# Patient Record
Sex: MALE | Race: WHITE
[De-identification: names, ages, dates, MRNs, and addresses within clinical notes are randomized per-mention and may not be internally consistent; named-entity substitution may affect disease eponyms.]

---

## 2017-10-05 ENCOUNTER — HOSPITAL ENCOUNTER (EMERGENCY)
Dept: HOSPITAL 31 - C.ER | Age: 52
LOS: 1 days | Discharge: HOME | End: 2017-10-06
Payer: COMMERCIAL

## 2017-10-05 DIAGNOSIS — E78.00: ICD-10-CM

## 2017-10-05 DIAGNOSIS — I10: ICD-10-CM

## 2017-10-05 DIAGNOSIS — N20.0: Primary | ICD-10-CM

## 2017-10-05 PROCEDURE — 74176 CT ABD & PELVIS W/O CONTRAST: CPT

## 2017-10-05 PROCEDURE — 96361 HYDRATE IV INFUSION ADD-ON: CPT

## 2017-10-05 PROCEDURE — 80053 COMPREHEN METABOLIC PANEL: CPT

## 2017-10-05 PROCEDURE — 83690 ASSAY OF LIPASE: CPT

## 2017-10-05 PROCEDURE — 96374 THER/PROPH/DIAG INJ IV PUSH: CPT

## 2017-10-05 PROCEDURE — 81001 URINALYSIS AUTO W/SCOPE: CPT

## 2017-10-05 PROCEDURE — 96375 TX/PRO/DX INJ NEW DRUG ADDON: CPT

## 2017-10-05 PROCEDURE — 85025 COMPLETE CBC W/AUTO DIFF WBC: CPT

## 2017-10-05 PROCEDURE — 99284 EMERGENCY DEPT VISIT MOD MDM: CPT

## 2017-10-06 VITALS
DIASTOLIC BLOOD PRESSURE: 83 MMHG | OXYGEN SATURATION: 97 % | RESPIRATION RATE: 18 BRPM | HEART RATE: 67 BPM | SYSTOLIC BLOOD PRESSURE: 134 MMHG

## 2017-10-06 VITALS — TEMPERATURE: 99 F

## 2017-10-06 LAB
ALBUMIN/GLOB SERPL: 1 {RATIO} (ref 1–2.1)
ALP SERPL-CCNC: 68 U/L (ref 38–126)
ALT SERPL-CCNC: 53 U/L (ref 21–72)
AST SERPL-CCNC: 31 U/L (ref 17–59)
BASOPHILS # BLD AUTO: 0.1 K/UL (ref 0–0.2)
BASOPHILS NFR BLD: 0.8 % (ref 0–2)
BILIRUB SERPL-MCNC: 0.5 MG/DL (ref 0.2–1.3)
BILIRUB UR-MCNC: NEGATIVE MG/DL
BUN SERPL-MCNC: 16 MG/DL (ref 9–20)
CALCIUM SERPL-MCNC: 8.9 MG/DL (ref 8.6–10.4)
CHLORIDE SERPL-SCNC: 97 MMOL/L (ref 98–107)
CO2 SERPL-SCNC: 23 MMOL/L (ref 22–30)
EOSINOPHIL # BLD AUTO: 0.2 K/UL (ref 0–0.7)
EOSINOPHIL NFR BLD: 1.9 % (ref 0–4)
ERYTHROCYTE [DISTWIDTH] IN BLOOD BY AUTOMATED COUNT: 12.9 % (ref 11.5–14.5)
GLOBULIN SER-MCNC: 4.1 GM/DL (ref 2.2–3.9)
GLUCOSE SERPL-MCNC: 231 MG/DL (ref 75–110)
GLUCOSE UR STRIP-MCNC: (no result) MG/DL
HCT VFR BLD CALC: 44.5 % (ref 35–51)
KETONES UR STRIP-MCNC: NEGATIVE MG/DL
LEUKOCYTE ESTERASE UR-ACNC: (no result) LEU/UL
LYMPHOCYTES # BLD AUTO: 2.1 K/UL (ref 1–4.3)
LYMPHOCYTES NFR BLD AUTO: 19.2 % (ref 20–40)
MCH RBC QN AUTO: 30.3 PG (ref 27–31)
MCHC RBC AUTO-ENTMCNC: 33.9 G/DL (ref 33–37)
MCV RBC AUTO: 89.4 FL (ref 80–94)
MONOCYTES # BLD: 0.8 K/UL (ref 0–0.8)
MONOCYTES NFR BLD: 7.6 % (ref 0–10)
NRBC BLD AUTO-RTO: 0 % (ref 0–2)
PH UR STRIP: 5 [PH] (ref 5–8)
PLATELET # BLD: 276 K/UL (ref 130–400)
PMV BLD AUTO: 8 FL (ref 7.2–11.7)
POTASSIUM SERPL-SCNC: 4.3 MMOL/L (ref 3.6–5.2)
PROT SERPL-MCNC: 8 G/DL (ref 6.3–8.3)
PROT UR STRIP-MCNC: NEGATIVE MG/DL
RBC # UR STRIP: (no result) /UL
RBC #/AREA URNS HPF: 17 /HPF (ref 0–3)
SODIUM SERPL-SCNC: 133 MMOL/L (ref 132–148)
SP GR UR STRIP: 1.02 (ref 1–1.03)
UROBILINOGEN UR-MCNC: NORMAL MG/DL (ref 0.2–1)
WBC # BLD AUTO: 10.8 K/UL (ref 4.8–10.8)
WBC #/AREA URNS HPF: 3 /HPF (ref 0–5)

## 2017-10-06 NOTE — CT
EXAM:

  CT Abdomen and Pelvis Without Intravenous Contrast



EXAM DATE/TIME:

  10/6/2017 1:10 AM



CLINICAL HISTORY:

  52 years old, male; Pain; Abdominal pain; Patient HX: 11-13-15; Additional 

info: Left flank pain, HX of kidney stones



TECHNIQUE:

  Axial computed tomography images of the abdomen and pelvis without 

intravenous contrast.  All CT scans at this facility use one or more dose 

reduction techniques, viz.: automated exposure control; ma/kV adjustment per 

patient size (including targeted exams where dose is matched to indication; 

i.e. head); or iterative reconstruction technique.

  Coronal and sagittal reformatted images were created and reviewed.



COMPARISON:

  CT - ABD   PELVIS IV CONTRAST ONLY 11/13/2015 6:12:44 PM



FINDINGS:



  The gallbladder is contracted.



  The liver is decreased in attenuation consistent with fatty infiltration.



  The  adrenals, spleen, and pancreas appear grossly normal on this 

non-contrast study. 



   The previously seen obstructing calculi right UVJ is no longer present. The 

right hydronephrosis has resolved.



  There is new mild left hydronephrosis, hydroureter, and left perinephric 

stranding. There is a 3 x 2 mm calculi in the very distal left ureter.



  The bowel appears grossly normal.  



  A normal appendix is identified axial images 146-152.



  Small lymph nodes are noted in the groins.





IMPRESSION:   



   Obstructing calculi very distal left ureter.

## 2017-10-06 NOTE — C.PDOC
History Of Present Illness


Patient presents to the ER with a complaint of left flank pain radiating to the 

back and groin that began a few hours ago, associated with nausea. Patient 

reports he has a Hx of kidney stones; denies fever, chills, or vomiting.


Time Seen by Provider: 10/06/17 00:15


Chief Complaint (Nursing): Abdominal Pain


History Per: Patient


History/Exam Limitations: no limitations


Onset/Duration Of Symptoms: Hrs


Current Symptoms Are (Timing): Still Present


Severity: Mild


Pain Scale Rating Of: 4


Location Of Pain/Discomfort: Other (Left flank)


Radiation Of Pain To:: None


Quality Of Discomfort: Unable To Describe


Associated Symptoms: Nausea.  denies: Fever, Chills, Vomiting


Exacerbating Factors: None


Alleviating Factors: None


Recent travel outside of the [place]: No





Past Medical History


Reviewed: Historical Data, Nursing Documentation, Vital Signs


Vital Signs: 


 Last Vital Signs











Temp  99 F   10/06/17 00:02


 


Pulse  77   10/06/17 00:02


 


Resp  17   10/06/17 00:02


 


BP  165/95 H  10/06/17 00:02


 


Pulse Ox  100   10/06/17 01:01














- Medical History


PMH: Diverticulitis, HTN, Hypercholesterolemia


Surgical History: Tonsillectomy


Family History: States: No Known Family Hx





- Social History


Hx Alcohol Use: No


Hx Substance Use: No





- Immunization History


Hx Tetanus Toxoid Vaccination: No


Hx Influenza Vaccination: No


Hx Pneumococcal Vaccination: No





Review Of Systems


Constitutional: Negative for: Fever, Chills


Gastrointestinal: Positive for: Nausea.  Negative for: Vomiting


Genitourinary: Positive for: Other (Groin pain radiating from left flank)


Musculoskeletal: Positive for: Back Pain (Left flank)





Physical Exam





- Physical Exam


Appears: Non-toxic


Skin: Warm, Dry


Head: Normacephalic


Oral Mucosa: Moist


Chest: Symmetrical


Cardiovascular: Rhythm Regular


Respiratory: No Rales, No Rhonchi, No Wheezing


Gastrointestinal/Abdominal: Soft, No Tenderness, Distention


Back: CVA Tenderness (Mild, left), Other (Left flank)


Neurological/Psych: Oriented x3





ED Course And Treatment





- Laboratory Results


Result Diagrams: 


 10/06/17 00:32





 10/06/17 00:32


O2 Sat by Pulse Oximetry: 100 (Room air)


Pulse Ox Interpretation: Normal


Progress Note: Blood work and urinalysis ordered. Flomax, toradol, zofran, and 

IV fluids administered.


Reevaluation Time: 02:38


Reassessment Condition: Improved





Disposition


Counseled Patient/Family Regarding: Studies Performed, Diagnosis, Need For 

Followup, Rx Given





- Disposition


Referrals: 


Mj crane MD [Primary Care Provider] - 


Chema Matute MD [Staff Provider] - 


Disposition: HOME/ ROUTINE


Disposition Time: 00:15


Condition: FAIR


Prescriptions: 


Naproxen [Naprosyn] 1 tab PO BID PRN #15 tab


 PRN Reason: Pain


Ondansetron ODT [Zofran ODT] 1 odt PO BID PRN #6 odt


 PRN Reason: Nausea/Vomiting


oxyCODONE/Acetaminophen [Percocet 5/325 mg Tab] 1 tab PO QID PRN #14 tab


 PRN Reason: Pain


Tamsulosin [Flomax] 0.4 mg PO DAILY #15 cap


Instructions:  Renal Colic (ED), Kidney Stones (DC), How to Strain Your Urine (

ED), Flank Pain (ED)


Forms:  [facility] (English)





- Clinical Impression


Clinical Impression: 


 Renal colic on left side, Kidney stone on left side








- Scribe Statement


The provider has reviewed the documentation as recorded by the Scribe





Christiano Rivera





All medical record entries made by the Scribe were at my direction and 

personally dictated by me. I have reviewed the chart and agree that the record 

accurately reflects my personal performance of the history, physical exam, 

medical decision making, and the department course for this patient. I have 

also personally directed, reviewed, and agree with the discharge instructions 

and disposition.

## 2018-02-12 ENCOUNTER — HOSPITAL ENCOUNTER (INPATIENT)
Dept: HOSPITAL 31 - C.ER | Age: 53
LOS: 3 days | Discharge: HOME | DRG: 152 | End: 2018-02-15
Payer: COMMERCIAL

## 2018-02-12 VITALS — BODY MASS INDEX: 42.2 KG/M2

## 2018-02-12 DIAGNOSIS — E78.00: ICD-10-CM

## 2018-02-12 DIAGNOSIS — Z87.442: ICD-10-CM

## 2018-02-12 DIAGNOSIS — Z88.2: ICD-10-CM

## 2018-02-12 DIAGNOSIS — I10: ICD-10-CM

## 2018-02-12 DIAGNOSIS — E11.65: ICD-10-CM

## 2018-02-12 DIAGNOSIS — K61.2: Primary | ICD-10-CM

## 2018-02-12 DIAGNOSIS — L03.317: ICD-10-CM

## 2018-02-12 LAB
ALBUMIN SERPL-MCNC: 3.8 G/DL (ref 3.5–5)
ALBUMIN/GLOB SERPL: 0.9 {RATIO} (ref 1–2.1)
ALT SERPL-CCNC: 37 U/L (ref 21–72)
AST SERPL-CCNC: 27 U/L (ref 17–59)
BACTERIA #/AREA URNS HPF: (no result) /[HPF]
BASOPHILS # BLD AUTO: 0.1 K/UL (ref 0–0.2)
BASOPHILS NFR BLD: 0.5 % (ref 0–2)
BILIRUB UR-MCNC: NEGATIVE MG/DL
BUN SERPL-MCNC: 10 MG/DL (ref 9–20)
CALCIUM SERPL-MCNC: 9 MG/DL (ref 8.6–10.4)
EOSINOPHIL # BLD AUTO: 0.2 K/UL (ref 0–0.7)
EOSINOPHIL NFR BLD: 1.5 % (ref 0–4)
ERYTHROCYTE [DISTWIDTH] IN BLOOD BY AUTOMATED COUNT: 12.5 % (ref 11.5–14.5)
GFR NON-AFRICAN AMERICAN: > 60
GLUCOSE UR STRIP-MCNC: (no result) MG/DL
HGB BLD-MCNC: 13.9 G/DL (ref 12–18)
LEUKOCYTE ESTERASE UR-ACNC: (no result) LEU/UL
LIPASE: 41 U/L (ref 23–300)
LYMPHOCYTES # BLD AUTO: 1.9 K/UL (ref 1–4.3)
LYMPHOCYTES NFR BLD AUTO: 16.6 % (ref 20–40)
MCH RBC QN AUTO: 29.9 PG (ref 27–31)
MCHC RBC AUTO-ENTMCNC: 33.7 G/DL (ref 33–37)
MCV RBC AUTO: 88.8 FL (ref 80–94)
MONOCYTES # BLD: 1 K/UL (ref 0–0.8)
MONOCYTES NFR BLD: 8.9 % (ref 0–10)
NEUTROPHILS # BLD: 8.4 K/UL (ref 1.8–7)
NEUTROPHILS NFR BLD AUTO: 72.5 % (ref 50–75)
NRBC BLD AUTO-RTO: 0 % (ref 0–2)
PH UR STRIP: 5 [PH] (ref 5–8)
PLATELET # BLD: 333 K/UL (ref 130–400)
PMV BLD AUTO: 7.9 FL (ref 7.2–11.7)
PROT UR STRIP-MCNC: NEGATIVE MG/DL
RBC # BLD AUTO: 4.66 MIL/UL (ref 4.4–5.9)
RBC # UR STRIP: NEGATIVE /UL
SP GR UR STRIP: 1.03 (ref 1–1.03)
URINE NITRATE: NEGATIVE
UROBILINOGEN UR-MCNC: NORMAL MG/DL (ref 0.2–1)
WBC # BLD AUTO: 11.5 K/UL (ref 4.8–10.8)

## 2018-02-12 NOTE — C.PDOC
History Of Present Illness





<Alicia Ramon - Last Filed: 02/12/18 23:26>





<Neha Coffey - Last Filed: 02/12/18 23:50>


52 year old male with a Hx of diabetes presents to the ER with a complaint of 

pain to the right inner buttock area for more than a week. Patient was seen by 

PMD who gave him percocet and started him on cipro, whoever, patient reports 

increased pain and difficulty urinating due to pain. Patient reports having 

subjective fever yesterday, denies injury, rectal bleeding, or diarrhea. (Alicia Ramon)


History Per: Patient


History/Exam Limitations: no limitations


Onset/Duration Of Symptoms: Days


Current Symptoms Are (Timing): Still Present


Recent travel outside of the [place]: No





<Alicia Ramon - Last Filed: 02/12/18 23:26>





<Neha Coffey - Last Filed: 02/12/18 23:50>


Time Seen by Provider: 02/12/18 19:14


Chief Complaint (Nursing): Abnormal Skin Integrity





Past Medical History


Reviewed: Historical Data, Nursing Documentation, Vital Signs





- Medical History


PMH: Diverticulitis, HTN, Hypercholesterolemia


Surgical History: Tonsillectomy


Family History: States: Unknown Family Hx





- Social History


Hx Alcohol Use: No


Hx Substance Use: No





- Immunization History


Hx Tetanus Toxoid Vaccination: No


Hx Influenza Vaccination: No


Hx Pneumococcal Vaccination: No





<Alicia Ramon - Last Filed: 02/12/18 23:26>


Vital Signs: 





 Last Vital Signs











Temp  101.1 F H  02/12/18 21:40


 


Pulse  91 H  02/12/18 21:40


 


Resp  18   02/12/18 21:40


 


BP  114/64   02/12/18 21:40


 


Pulse Ox  98   02/12/18 23:30














Review Of Systems


Constitutional: Positive for: Fever (subjective)


Gastrointestinal: Positive for: Other (inner right buttock pain, no rectal 

bleeding).  Negative for: Nausea, Vomiting, Diarrhea


Genitourinary: Positive for: Other (Difficulty urinating)





<Alicia Ramon - Last Filed: 02/12/18 23:26>





Physical Exam





- Physical Exam


Appears: Non-toxic


Skin: Normal Color, Warm, Dry


Head: Atraumatic, Normacephalic


Eye(s): bilateral: Normal Inspection


Oral Mucosa: Moist


Chest: Symmetrical, No Tenderness


Cardiovascular: Rhythm Regular


Respiratory: Normal Breath Sounds, No Rales, No Rhonchi, No Wheezing


Gastrointestinal/Abdominal: Soft, No Tenderness


Neurological/Psych: Oriented x3, Normal Speech





<Alicia Ramon - Last Filed: 02/12/18 23:26>





ED Course And Treatment





- Laboratory Results


Result Diagrams: 


 02/12/18 20:10





 02/12/18 20:10


O2 Sat by Pulse Oximetry: 98 (Room air)


Pulse Ox Interpretation: Normal


Progress Note: CT pelvis, blood work, and urinalysis ordered. IV fluids 

administered. case was signed out to  at 11 pm. CT pelvis- pending





<Alicia Ramon - Last Filed: 02/12/18 23:26>





- Laboratory Results


Result Diagrams: 


 02/12/18 20:10





 02/12/18 20:10





<Neha Coffey - Last Filed: 02/12/18 23:50>





Disposition





- Disposition


Disposition Time: 23:27





<Alicia Ramon - Last Filed: 02/12/18 23:26>


Discussed With DrRoss: Fatimah Sanz


Comment: He accepted pt on his service and gave admitting orders to the nurse.


Doctor Will See Patient In The: Hospital





- POA


Present On Arrival: Poor Glycemic Control





<Neha Coffey - Last Filed: 02/12/18 23:50>





- Disposition


Disposition: HOSPITALIZED


Condition: FAIR


Forms:  [facility] (English)





- Clinical Impression


Clinical Impression: 


 Failure of outpatient treatment, Fever, Cellulitis of perianal region, 

Uncontrolled diabetes mellitus








- PA / NP / Resident Statement


ALISHA has reviewed & agrees with the documentation as recorded.





- Scribe Statement


The provider has reviewed the documentation as recorded by the Scribe





<Alicia Ramon - Last Filed: 02/12/18 23:26>





- PA / NP / Resident Statement


ALISHA has reviewed & agrees with the documentation as recorded.





<Neha Coffey - Last Filed: 02/12/18 23:50>





- Scribe Statement





Christiano Rivera





All medical record entries made by the Scribe were at my direction and 

personally dictated by me. I have reviewed the chart and agree that the record 

accurately reflects my personal performance of the history, physical exam, 

medical decision making, and the department course for this patient. I have 

also personally directed, reviewed, and agree with the discharge instructions 

and disposition. (Alicia Ramon)





Physician Patient Turnover


Patient Signed Over To: Neha Coffey


Handoff Comments: CT pelvis and dispo pending





<Alicia Ramon - Last Filed: 02/12/18 23:26>

## 2018-02-12 NOTE — CT
EXAM:

  CT Pelvis With Intravenous Contrast



CLINICAL HISTORY:

  52 years old, male; Signs and symptoms; Cellulitis and other: Abscess; 

Buttock; Additional info: Perirectal abscess



TECHNIQUE:

  Axial computed tomography images of the pelvis with intravenous contrast.  

All CT scans at this facility use one or more dose reduction techniques, viz.: 

automated exposure control; ma/kV adjustment per patient size (including 

targeted exams where dose is matched to indication; i.e. head); or iterative 

reconstruction technique.

  Coronal and sagittal reformatted images were created and reviewed.



CONTRAST:

  100 mL of omnipaque 300 administered intravenously.



COMPARISON:

  CT - ABD   PELVIS IV CONTRAST ONLY 2015-11-13 18:12



FINDINGS:

  Bowel:  No obstruction.  No mucosal thickening.

  Appendix:  No findings to suggest acute appendicitis.

  Intraperitoneal space:  No significant fluid collection.  No free air.

  Bladder:  Unremarkable.

  Reproductive:  Mildly enlarged prostate.

  Bones/joints:  No acute fracture.

  Soft tissues:  4.8 x 2.0 x 4.7 cm ill-defined lesion measuring slightly 

higher than fluid attenuation within right perianal region.  Mild stranding 

within right ischiorectal fossa.  

  Vasculature:  Unremarkable.  No aneurysm.

  Lymph nodes:  No pathologically enlarged lymph nodes.



IMPRESSION:     

1.  Probable perianal phlegmon/early abscess.  Clinical correlation is needed.

2.  Incidental/non-acute findings are described above.

## 2018-02-13 LAB
ALBUMIN SERPL-MCNC: 3.6 G/DL (ref 3.5–5)
ALBUMIN/GLOB SERPL: 0.9 {RATIO} (ref 1–2.1)
ALT SERPL-CCNC: 37 U/L (ref 21–72)
AST SERPL-CCNC: 19 U/L (ref 17–59)
BASOPHILS # BLD AUTO: 0.1 K/UL (ref 0–0.2)
BASOPHILS NFR BLD: 0.5 % (ref 0–2)
BUN SERPL-MCNC: 9 MG/DL (ref 9–20)
CALCIUM SERPL-MCNC: 7.8 MG/DL (ref 8.6–10.4)
EOSINOPHIL # BLD AUTO: 0.2 K/UL (ref 0–0.7)
EOSINOPHIL NFR BLD: 2 % (ref 0–4)
ERYTHROCYTE [DISTWIDTH] IN BLOOD BY AUTOMATED COUNT: 12.5 % (ref 11.5–14.5)
GFR NON-AFRICAN AMERICAN: > 60
HDLC SERPL-MCNC: 21 MG/DL (ref 30–70)
HGB BLD-MCNC: 13.6 G/DL (ref 12–18)
LDLC SERPL-MCNC: 140 MG/DL (ref 0–129)
LYMPHOCYTES # BLD AUTO: 1.8 K/UL (ref 1–4.3)
LYMPHOCYTES NFR BLD AUTO: 17.3 % (ref 20–40)
MCH RBC QN AUTO: 30.5 PG (ref 27–31)
MCHC RBC AUTO-ENTMCNC: 34.4 G/DL (ref 33–37)
MCV RBC AUTO: 88.7 FL (ref 80–94)
MONOCYTES # BLD: 1 K/UL (ref 0–0.8)
MONOCYTES NFR BLD: 9.9 % (ref 0–10)
NEUTROPHILS # BLD: 7.4 K/UL (ref 1.8–7)
NEUTROPHILS NFR BLD AUTO: 70.3 % (ref 50–75)
NRBC BLD AUTO-RTO: 0.1 % (ref 0–2)
PLATELET # BLD: 292 K/UL (ref 130–400)
PMV BLD AUTO: 7.6 FL (ref 7.2–11.7)
RBC # BLD AUTO: 4.48 MIL/UL (ref 4.4–5.9)
WBC # BLD AUTO: 10.5 K/UL (ref 4.8–10.8)

## 2018-02-13 RX ADMIN — OXYCODONE HYDROCHLORIDE AND ACETAMINOPHEN PRN TAB: 5; 325 TABLET ORAL at 16:31

## 2018-02-13 RX ADMIN — SODIUM CHLORIDE SCH MLS/HR: 9 INJECTION, SOLUTION INTRAMUSCULAR; INTRAVENOUS; SUBCUTANEOUS at 11:45

## 2018-02-13 RX ADMIN — SODIUM CHLORIDE SCH MLS/HR: 9 INJECTION, SOLUTION INTRAVENOUS at 16:40

## 2018-02-13 RX ADMIN — SODIUM CHLORIDE SCH MLS/HR: 9 INJECTION, SOLUTION INTRAMUSCULAR; INTRAVENOUS; SUBCUTANEOUS at 13:07

## 2018-02-13 RX ADMIN — OXYCODONE HYDROCHLORIDE AND ACETAMINOPHEN PRN TAB: 5; 325 TABLET ORAL at 22:32

## 2018-02-13 RX ADMIN — HUMAN INSULIN SCH: 100 INJECTION, SOLUTION SUBCUTANEOUS at 22:00

## 2018-02-13 RX ADMIN — HUMAN INSULIN SCH UNIT: 100 INJECTION, SOLUTION SUBCUTANEOUS at 17:12

## 2018-02-13 RX ADMIN — SODIUM CHLORIDE SCH: 9 INJECTION, SOLUTION INTRAMUSCULAR; INTRAVENOUS; SUBCUTANEOUS at 00:48

## 2018-02-13 RX ADMIN — HUMAN INSULIN SCH UNIT: 100 INJECTION, SOLUTION SUBCUTANEOUS at 13:16

## 2018-02-13 RX ADMIN — OXYCODONE HYDROCHLORIDE AND ACETAMINOPHEN PRN TAB: 5; 325 TABLET ORAL at 01:26

## 2018-02-13 RX ADMIN — SODIUM CHLORIDE SCH MLS/HR: 9 INJECTION, SOLUTION INTRAMUSCULAR; INTRAVENOUS; SUBCUTANEOUS at 05:45

## 2018-02-13 RX ADMIN — SODIUM CHLORIDE SCH MLS/HR: 9 INJECTION, SOLUTION INTRAVENOUS at 22:15

## 2018-02-13 RX ADMIN — HUMAN INSULIN SCH UNIT: 100 INJECTION, SOLUTION SUBCUTANEOUS at 09:14

## 2018-02-13 RX ADMIN — SODIUM CHLORIDE SCH MLS/HR: 9 INJECTION, SOLUTION INTRAVENOUS at 10:15

## 2018-02-13 RX ADMIN — SODIUM CHLORIDE SCH MLS/HR: 9 INJECTION, SOLUTION INTRAMUSCULAR; INTRAVENOUS; SUBCUTANEOUS at 18:15

## 2018-02-13 RX ADMIN — OXYCODONE HYDROCHLORIDE AND ACETAMINOPHEN PRN TAB: 5; 325 TABLET ORAL at 09:17

## 2018-02-13 NOTE — CP.PCM.CON
<Stu Plummer - Last Filed: 02/13/18 01:44>





History of Present Illness





- History of Present Illness


History of Present Illness: 


General Surgery Consult





HPI: 52M presents to the ED with pain to the right gluteal cleft x 5 days. Pt 

seen by PMD prior to this and was given percocet and cipro. His pain still 

increased to 10/10 and he had dysurea due to pain. Pt reports having subjective 

fever yesterday (reports 101 in ED). Denies headache, chest pain, SOB, abd pain

, diarrhea, constipation, hematuria.





PMH: DM, HTN, Hypercholesterolemia, Chronic upper back and R arm pain


PSH: Tonsillectomy


SH: No tobacco, EtOH, or drug use


All: sulfa (rash)


Meds: see MAR





Review of Systems





- Review of Systems


All systems: reviewed and no additional remarkable complaints except (as per HPI

)





Past Patient History





- Infectious Disease


Hx of Infectious Diseases: None





- Past Social History


Smoking Status: Never Smoked





- CARDIAC


Hx Hypercholesterolemia: Yes


Hx Hypertension: Yes





- ENDOCRINE/METABOLIC


Hx Endocrine Disorders: Yes


Hx Diabetes Mellitus Type 2: Yes





- GASTROINTESTINAL


Hx Diverticulitis: Yes





- GENITOURINARY/GYNECOLOGICAL


Other/Comment: kidney stone





- PSYCHIATRIC


Hx Substance Use: No





- SURGICAL HISTORY


Hx Tonsillectomy: Yes





- ANESTHESIA


Hx Anesthesia: Yes


Hx Anesthesia Reactions: No





Meds


Allergies/Adverse Reactions: 


 Allergies











Allergy/AdvReac Type Severity Reaction Status Date / Time


 


Sulfa (Sulfonamide Allergy   Verified 02/12/18 17:35





Antibiotics)     














- Medications


Medications: 


 Current Medications





Acetaminophen (Tylenol 325mg Tab)  650 mg PO Q6 PRN


   PRN Reason: Pain, Mild (1-3)


Clindamycin Phosphate 300 mg/ (Sodium Chloride)  52 mls @ 100 mls/hr IVPB Q6H 

JOY


   Last Admin: 02/13/18 00:48 Dose:  Not Given


Insulin Human Regular (Novolin R)  0 unit SC ACHS JOY


   PRN Reason: Protocol


Oxycodone/Acetaminophen (Percocet 5/325 Mg Tab)  1 tab PO Q6H PRN


   PRN Reason: Pain, moderate (4-7)


   Stop: 02/16/18 00:31


   Last Admin: 02/13/18 01:26 Dose:  1 tab











Physical Exam





- Constitutional


Appears: Non-toxic, No Acute Distress





- Head Exam


Head Exam: ATRAUMATIC, NORMOCEPHALIC





- Eye Exam


Eye Exam: EOMI.  absent: Scleral icterus





- ENT Exam


ENT Exam: Mucous Membranes Dry


Additional comments: 





trachea midline





- Respiratory Exam


Respiratory Exam: NORMAL BREATHING PATTERN.  absent: Respiratory Distress





- Cardiovascular Exam


Cardiovascular Exam: RRR, +S1, +S2





- GI/Abdominal Exam


GI & Abdominal Exam: Soft.  absent: Distended, Firm, Guarding, Rigid, Tenderness





- Rectal Exam


Additional comments: 


good sphincter tone. TTP and induration along R gluteal fold towards perineum. 


No internal opening/fistula seen or palpated. 





- Extremities Exam


Extremities exam: Positive for: normal capillary refill.  Negative for: calf 

tenderness





- Back Exam


Back exam: absent: CVA tenderness (L), CVA tenderness (R)





- Neurological Exam


Neurological exam: Alert, Oriented x3





- Psychiatric Exam


Psychiatric exam: Normal Affect, Normal Mood





- Skin


Skin Exam: Dry, Warm





Results





- Vital Signs


Recent Vital Signs: 


 Last Vital Signs











Temp  98.8 F   02/13/18 01:24


 


Pulse  103 H  02/13/18 01:24


 


Resp  20   02/13/18 01:24


 


BP  125/80   02/13/18 01:24


 


Pulse Ox  98   02/13/18 01:24














- Labs


Result Diagrams: 


 02/12/18 20:10





 02/12/18 20:10


Labs: 


 Laboratory Results - last 24 hr











  02/12/18 02/12/18 02/12/18





  20:10 20:10 20:10


 


WBC  11.5 H  


 


RBC  4.66  


 


Hgb  13.9  


 


Hct  41.4  


 


MCV  88.8  


 


MCH  29.9  


 


MCHC  33.7  


 


RDW  12.5  


 


Plt Count  333  


 


MPV  7.9  


 


Neut % (Auto)  72.5  


 


Lymph % (Auto)  16.6 L  


 


Mono % (Auto)  8.9  


 


Eos % (Auto)  1.5  


 


Baso % (Auto)  0.5  


 


Neut # (Auto)  8.4 H  


 


Lymph # (Auto)  1.9  


 


Mono # (Auto)  1.0 H  


 


Eos # (Auto)  0.2  


 


Baso # (Auto)  0.1  


 


Sodium   134 


 


Potassium   3.7 


 


Chloride   93 L 


 


Carbon Dioxide   27 


 


Anion Gap   17 


 


BUN   10 


 


Creatinine   0.8 


 


Est GFR ( Amer)   > 60 


 


Est GFR (Non-Af Amer)   > 60 


 


POC Glucose (mg/dL)   


 


Random Glucose   274 H 


 


Calcium   9.0 


 


Total Bilirubin   0.5 


 


AST   27 


 


ALT   37 


 


Alkaline Phosphatase   94 


 


Total Protein   8.0 


 


Albumin   3.8 


 


Globulin   4.1 H 


 


Albumin/Globulin Ratio   0.9 L 


 


Lipase   41 


 


Urine Color    Yellow


 


Urine Clarity    Clear


 


Urine pH    5.0


 


Ur Specific Gravity    1.029


 


Urine Protein    Negative


 


Urine Glucose (UA)    3+ H


 


Urine Ketones    Trace


 


Urine Blood    Negative


 


Urine Nitrate    Negative


 


Urine Bilirubin    Negative


 


Urine Urobilinogen    Normal


 


Ur Leukocyte Esterase    Neg


 


Urine WBC (Auto)    < 1


 


Urine RBC (Auto)    < 1


 


Ur Transition Epith Cell    < 1


 


Urine Bacteria    Rare














  02/13/18





  00:47


 


WBC 


 


RBC 


 


Hgb 


 


Hct 


 


MCV 


 


MCH 


 


MCHC 


 


RDW 


 


Plt Count 


 


MPV 


 


Neut % (Auto) 


 


Lymph % (Auto) 


 


Mono % (Auto) 


 


Eos % (Auto) 


 


Baso % (Auto) 


 


Neut # (Auto) 


 


Lymph # (Auto) 


 


Mono # (Auto) 


 


Eos # (Auto) 


 


Baso # (Auto) 


 


Sodium 


 


Potassium 


 


Chloride 


 


Carbon Dioxide 


 


Anion Gap 


 


BUN 


 


Creatinine 


 


Est GFR ( Amer) 


 


Est GFR (Non-Af Amer) 


 


POC Glucose (mg/dL)  186 H


 


Random Glucose 


 


Calcium 


 


Total Bilirubin 


 


AST 


 


ALT 


 


Alkaline Phosphatase 


 


Total Protein 


 


Albumin 


 


Globulin 


 


Albumin/Globulin Ratio 


 


Lipase 


 


Urine Color 


 


Urine Clarity 


 


Urine pH 


 


Ur Specific Gravity 


 


Urine Protein 


 


Urine Glucose (UA) 


 


Urine Ketones 


 


Urine Blood 


 


Urine Nitrate 


 


Urine Bilirubin 


 


Urine Urobilinogen 


 


Ur Leukocyte Esterase 


 


Urine WBC (Auto) 


 


Urine RBC (Auto) 


 


Ur Transition Epith Cell 


 


Urine Bacteria 














- Imaging and Cardiology


  ** CT scan - pelvis


Status: Image reviewed by me, Report reviewed by me





Assessment & Plan





- Assessment and Plan (Free Text)


Assessment: 


52M with perianal phlegmon and cellulitis


Plan: 


IV abx


Stool softener


IVF


Warm compress to R side of gluteal cleft


Will consider I&D if pt forms a drainable abscess


Will D/W Dr. Carolyn Plummer PGY4





<Yuri Leon - Last Filed: 02/15/18 20:58>





Results





- Vital Signs


Recent Vital Signs: 


 Last Vital Signs











Temp  97.3 F L  02/15/18 07:00


 


Pulse  81   02/15/18 07:00


 


Resp  18   02/15/18 07:00


 


BP  132/84   02/15/18 07:00


 


Pulse Ox  95   02/15/18 07:00














- Labs


Result Diagrams: 


 02/15/18 07:38





 02/15/18 07:38


Labs: 


 Laboratory Results - last 24 hr











  02/14/18 02/15/18 02/15/18





  21:35 06:46 07:38


 


WBC    6.1


 


RBC    4.54


 


Hgb    13.8


 


Hct    40.3


 


MCV    88.7


 


MCH    30.3


 


MCHC    34.1


 


RDW    12.4


 


Plt Count    315


 


MPV    7.7


 


Neut % (Auto)    57.9


 


Lymph % (Auto)    26.0


 


Mono % (Auto)    9.8


 


Eos % (Auto)    5.6 H


 


Baso % (Auto)    0.7


 


Neut # (Auto)    3.5


 


Lymph # (Auto)    1.6


 


Mono # (Auto)    0.6


 


Eos # (Auto)    0.3


 


Baso # (Auto)    0.0


 


Sodium   


 


Potassium   


 


Chloride   


 


Carbon Dioxide   


 


Anion Gap   


 


BUN   


 


Creatinine   


 


Est GFR ( Amer)   


 


Est GFR (Non-Af Amer)   


 


POC Glucose (mg/dL)  283 H  257 H 


 


Random Glucose   


 


Hemoglobin A1c   


 


Calcium   














  02/15/18 02/15/18 02/15/18





  07:38 07:38 11:18


 


WBC   


 


RBC   


 


Hgb   


 


Hct   


 


MCV   


 


MCH   


 


MCHC   


 


RDW   


 


Plt Count   


 


MPV   


 


Neut % (Auto)   


 


Lymph % (Auto)   


 


Mono % (Auto)   


 


Eos % (Auto)   


 


Baso % (Auto)   


 


Neut # (Auto)   


 


Lymph # (Auto)   


 


Mono # (Auto)   


 


Eos # (Auto)   


 


Baso # (Auto)   


 


Sodium  132  


 


Potassium  4.0  


 


Chloride  94 L  


 


Carbon Dioxide  27  


 


Anion Gap  15  


 


BUN  11  


 


Creatinine  0.8  


 


Est GFR ( Amer)  > 60  


 


Est GFR (Non-Af Amer)  > 60  


 


POC Glucose (mg/dL)    314 H


 


Random Glucose  305 H  


 


Hemoglobin A1c   9.8 H 


 


Calcium  8.5 L  














Attending/Attestation





- Attestation


I have personally seen and examined this patient.: Yes


I have fully participated in the care of the patient.: Yes


I have reviewed all pertinent clinical information: Yes


Notes (Text): 





Pt was seen and examined at bedside 


Agree with above note and assessment


Pt with left gluteal pain for 5 days


Perineal tenderness present


Labs and Radiology reviewed


Ass: Gluteal cellulitis with possible abscess


Plan : 


IV antibiotics


NPO, IVF 


c.w current mx


Plan d.w pt in detail


Risk and benefit explained in detail

## 2018-02-13 NOTE — CP.PCM.HP
History of Present Illness





- History of Present Illness


History of Present Illness: 





A 52 year old male with history of diabetes, hypertension, elevated cholesterol

, back and shoulder pain came for right side rectal pain for a week. He was 

unable to sit down because of pain. He had fever at home. He visited my office 

five days ago and was given a prescription for cipro antibiotics.


He had urination difficulty due to the pain.


He denies nausea, vomiting, cough, constipation or diarrhea.





Present on Admission





- Present on Admission


Any Indicators Present on Admission: No


History of DVT/PE: No


History of Uncontrolled Diabetes: No


Urinary Catheter: No


Decubitus Ulcer Present: No





Review of Systems





- Constitutional


Constitutional: Chills





- Cardiovascular


Cardiovascular: absent: Chest Pain





- Respiratory


Respiratory: absent: Cough





- Gastrointestinal


Gastrointestinal: absent: Bloating, Constipation, Cramping, Diarrhea





- Genitourinary


Genitourinary: Difficulty Urinating





- Musculoskeletal


Musculoskeletal: absent: Abnormal Gait





Past Patient History





- Infectious Disease


Hx of Infectious Diseases: None





- Past Social History


Smoking Status: Never Smoked





- CARDIAC


Hx Hypercholesterolemia: Yes


Hx Hypertension: Yes





- ENDOCRINE/METABOLIC


Hx Endocrine Disorders: Yes


Hx Diabetes Mellitus Type 2: Yes





- GASTROINTESTINAL


Hx Diverticulitis: Yes





- GENITOURINARY/GYNECOLOGICAL


Other/Comment: kidney stone





- PSYCHIATRIC


Hx Substance Use: No





- SURGICAL HISTORY


Hx Tonsillectomy: Yes





- ANESTHESIA


Hx Anesthesia: Yes


Hx Anesthesia Reactions: No





Meds


Allergies/Adverse Reactions: 


 Allergies











Allergy/AdvReac Type Severity Reaction Status Date / Time


 


Sulfa (Sulfonamide Allergy   Verified 02/12/18 17:35





Antibiotics)     














Physical Exam





- Constitutional


Appears: Non-toxic





- Neck Exam


Neck exam: Positive for: Normal Inspection





- Respiratory Exam


Respiratory Exam: Clear to Auscultation Bilateral.  absent: Wheezes





- Cardiovascular Exam


Cardiovascular Exam: REGULAR RHYTHM.  absent: Systolic Murmur





- GI/Abdominal Exam


GI & Abdominal Exam: Soft.  absent: Tenderness





- Rectal Exam


Rectal Exam: NORMAL INSPECTION (redness and tenderness on right rectal area)





Results





- Vital Signs


Recent Vital Signs: 





 Last Vital Signs











Temp  97.6 F   02/13/18 13:40


 


Pulse  78   02/13/18 13:40


 


Resp  20   02/13/18 13:40


 


BP  127/80   02/13/18 13:40


 


Pulse Ox  98   02/13/18 13:40














- Labs


Result Diagrams: 


 02/13/18 09:25





 02/13/18 09:25


Labs: 





 Laboratory Results - last 24 hr











  02/12/18 02/12/18 02/12/18





  20:10 20:10 20:10


 


WBC  11.5 H  


 


RBC  4.66  


 


Hgb  13.9  


 


Hct  41.4  


 


MCV  88.8  


 


MCH  29.9  


 


MCHC  33.7  


 


RDW  12.5  


 


Plt Count  333  


 


MPV  7.9  


 


Neut % (Auto)  72.5  


 


Lymph % (Auto)  16.6 L  


 


Mono % (Auto)  8.9  


 


Eos % (Auto)  1.5  


 


Baso % (Auto)  0.5  


 


Neut # (Auto)  8.4 H  


 


Lymph # (Auto)  1.9  


 


Mono # (Auto)  1.0 H  


 


Eos # (Auto)  0.2  


 


Baso # (Auto)  0.1  


 


Sodium   134 


 


Potassium   3.7 


 


Chloride   93 L 


 


Carbon Dioxide   27 


 


Anion Gap   17 


 


BUN   10 


 


Creatinine   0.8 


 


Est GFR ( Amer)   > 60 


 


Est GFR (Non-Af Amer)   > 60 


 


POC Glucose (mg/dL)   


 


Random Glucose   274 H 


 


Calcium   9.0 


 


Total Bilirubin   0.5 


 


AST   27 


 


ALT   37 


 


Alkaline Phosphatase   94 


 


Total Protein   8.0 


 


Albumin   3.8 


 


Globulin   4.1 H 


 


Albumin/Globulin Ratio   0.9 L 


 


Triglycerides   


 


Cholesterol   


 


LDL Cholesterol Direct   


 


HDL Cholesterol   


 


Lipase   41 


 


Urine Color    Yellow


 


Urine Clarity    Clear


 


Urine pH    5.0


 


Ur Specific Gravity    1.029


 


Urine Protein    Negative


 


Urine Glucose (UA)    3+ H


 


Urine Ketones    Trace


 


Urine Blood    Negative


 


Urine Nitrate    Negative


 


Urine Bilirubin    Negative


 


Urine Urobilinogen    Normal


 


Ur Leukocyte Esterase    Neg


 


Urine WBC (Auto)    < 1


 


Urine RBC (Auto)    < 1


 


Ur Transition Epith Cell    < 1


 


Urine Bacteria    Rare














  02/13/18 02/13/18 02/13/18





  00:47 09:09 09:25


 


WBC    10.5


 


RBC    4.48


 


Hgb    13.6


 


Hct    39.7


 


MCV    88.7


 


MCH    30.5


 


MCHC    34.4


 


RDW    12.5


 


Plt Count    292


 


MPV    7.6


 


Neut % (Auto)    70.3


 


Lymph % (Auto)    17.3 L


 


Mono % (Auto)    9.9


 


Eos % (Auto)    2.0


 


Baso % (Auto)    0.5


 


Neut # (Auto)    7.4 H


 


Lymph # (Auto)    1.8


 


Mono # (Auto)    1.0 H


 


Eos # (Auto)    0.2


 


Baso # (Auto)    0.1


 


Sodium   


 


Potassium   


 


Chloride   


 


Carbon Dioxide   


 


Anion Gap   


 


BUN   


 


Creatinine   


 


Est GFR ( Amer)   


 


Est GFR (Non-Af Amer)   


 


POC Glucose (mg/dL)  186 H  222 H 


 


Random Glucose   


 


Calcium   


 


Total Bilirubin   


 


AST   


 


ALT   


 


Alkaline Phosphatase   


 


Total Protein   


 


Albumin   


 


Globulin   


 


Albumin/Globulin Ratio   


 


Triglycerides   


 


Cholesterol   


 


LDL Cholesterol Direct   


 


HDL Cholesterol   


 


Lipase   


 


Urine Color   


 


Urine Clarity   


 


Urine pH   


 


Ur Specific Gravity   


 


Urine Protein   


 


Urine Glucose (UA)   


 


Urine Ketones   


 


Urine Blood   


 


Urine Nitrate   


 


Urine Bilirubin   


 


Urine Urobilinogen   


 


Ur Leukocyte Esterase   


 


Urine WBC (Auto)   


 


Urine RBC (Auto)   


 


Ur Transition Epith Cell   


 


Urine Bacteria   














  02/13/18 02/13/18 02/13/18





  09:25 12:59 16:26


 


WBC   


 


RBC   


 


Hgb   


 


Hct   


 


MCV   


 


MCH   


 


MCHC   


 


RDW   


 


Plt Count   


 


MPV   


 


Neut % (Auto)   


 


Lymph % (Auto)   


 


Mono % (Auto)   


 


Eos % (Auto)   


 


Baso % (Auto)   


 


Neut # (Auto)   


 


Lymph # (Auto)   


 


Mono # (Auto)   


 


Eos # (Auto)   


 


Baso # (Auto)   


 


Sodium  132  


 


Potassium  3.7  


 


Chloride  94 L  


 


Carbon Dioxide  28  


 


Anion Gap  15  


 


BUN  9  


 


Creatinine  0.8  


 


Est GFR ( Amer)  > 60  


 


Est GFR (Non-Af Amer)  > 60  


 


POC Glucose (mg/dL)   314 H  309 H


 


Random Glucose  254 H  


 


Calcium  7.8 L  


 


Total Bilirubin  0.7  


 


AST  19  


 


ALT  37  


 


Alkaline Phosphatase  81  


 


Total Protein  7.5  


 


Albumin  3.6  


 


Globulin  3.9  


 


Albumin/Globulin Ratio  0.9 L  


 


Triglycerides  159 H  


 


Cholesterol  185  


 


LDL Cholesterol Direct  140 H  


 


HDL Cholesterol  21 L  


 


Lipase   


 


Urine Color   


 


Urine Clarity   


 


Urine pH   


 


Ur Specific Gravity   


 


Urine Protein   


 


Urine Glucose (UA)   


 


Urine Ketones   


 


Urine Blood   


 


Urine Nitrate   


 


Urine Bilirubin   


 


Urine Urobilinogen   


 


Ur Leukocyte Esterase   


 


Urine WBC (Auto)   


 


Urine RBC (Auto)   


 


Ur Transition Epith Cell   


 


Urine Bacteria   














Assessment & Plan





- Assessment and Plan (Free Text)


Assessment: 





alpesh-rectal phlegmon or cellulitis


diabetes


Plan: 





intravenous antibiotics


culture study


diabetic control


surgery consult for possible I & D





- Date & Time


Date: 02/13/18


Time: 17:03





Decision To Admit





- Pt Status Changed To:


Hospital Disposition Of: Inpatient





- Admit Certification


Admit to Inpatient:: After my assessment, the patient will require 

hospitalization for at least two midnights.  This is because of the severity of 

symptoms shown, intensity of services needed, and/or the medical risk in this 

patient being treated as an outpatient.





- InPatient:


Physician Admission Certification:: as ordered





- .


Bed Request Type: Regular


Admitting Physician: Fatimah Sanz

## 2018-02-14 LAB
APTT BLD: 32 SECONDS (ref 21–34)
INR PPP: 1.2
PROTHROMBIN TIME: 13.3 SECONDS (ref 9.7–12.2)

## 2018-02-14 PROCEDURE — 0D9P0ZX DRAINAGE OF RECTUM, OPEN APPROACH, DIAGNOSTIC: ICD-10-PCS | Performed by: SURGERY

## 2018-02-14 PROCEDURE — 0DBP7ZZ EXCISION OF RECTUM, VIA NATURAL OR ARTIFICIAL OPENING: ICD-10-PCS | Performed by: SURGERY

## 2018-02-14 RX ADMIN — SODIUM CHLORIDE SCH MLS/HR: 9 INJECTION, SOLUTION INTRAVENOUS at 22:01

## 2018-02-14 RX ADMIN — HUMAN INSULIN SCH UNIT: 100 INJECTION, SOLUTION SUBCUTANEOUS at 16:50

## 2018-02-14 RX ADMIN — SODIUM CHLORIDE SCH MLS/HR: 9 INJECTION, SOLUTION INTRAVENOUS at 16:51

## 2018-02-14 RX ADMIN — HUMAN INSULIN SCH: 100 INJECTION, SOLUTION SUBCUTANEOUS at 13:41

## 2018-02-14 RX ADMIN — SODIUM CHLORIDE SCH MLS/HR: 9 INJECTION, SOLUTION INTRAVENOUS at 10:01

## 2018-02-14 RX ADMIN — SODIUM CHLORIDE SCH MLS/HR: 9 INJECTION, SOLUTION INTRAMUSCULAR; INTRAVENOUS; SUBCUTANEOUS at 17:49

## 2018-02-14 RX ADMIN — OXYCODONE HYDROCHLORIDE AND ACETAMINOPHEN PRN TAB: 5; 325 TABLET ORAL at 17:50

## 2018-02-14 RX ADMIN — SODIUM CHLORIDE SCH MLS/HR: 9 INJECTION, SOLUTION INTRAVENOUS at 05:22

## 2018-02-14 RX ADMIN — HYDROMORPHONE HYDROCHLORIDE PRN MG: 1 INJECTION, SOLUTION INTRAMUSCULAR; INTRAVENOUS; SUBCUTANEOUS at 13:03

## 2018-02-14 RX ADMIN — HUMAN INSULIN SCH: 100 INJECTION, SOLUTION SUBCUTANEOUS at 21:40

## 2018-02-14 RX ADMIN — OXYCODONE HYDROCHLORIDE AND ACETAMINOPHEN PRN TAB: 5; 325 TABLET ORAL at 22:45

## 2018-02-14 RX ADMIN — HUMAN INSULIN SCH: 100 INJECTION, SOLUTION SUBCUTANEOUS at 07:30

## 2018-02-14 RX ADMIN — SODIUM CHLORIDE SCH MLS/HR: 9 INJECTION, SOLUTION INTRAMUSCULAR; INTRAVENOUS; SUBCUTANEOUS at 06:34

## 2018-02-14 RX ADMIN — HYDROMORPHONE HYDROCHLORIDE PRN MG: 1 INJECTION, SOLUTION INTRAMUSCULAR; INTRAVENOUS; SUBCUTANEOUS at 13:35

## 2018-02-14 RX ADMIN — SODIUM CHLORIDE SCH MLS/HR: 9 INJECTION, SOLUTION INTRAMUSCULAR; INTRAVENOUS; SUBCUTANEOUS at 10:59

## 2018-02-14 NOTE — CP.PCM.PN
Subjective





- Date & Time of Evaluation


Date of Evaluation: 02/14/18


Time of Evaluation: 18:15





- Subjective


Subjective: 





less pain on right side alpesh-rectal area


after an I & D today





Objective





- Vital Signs/Intake and Output


Vital Signs (last 24 hours): 


 











Temp Pulse Resp BP Pulse Ox


 


 97.7 F   65   20   124/75   96 


 


 02/14/18 15:20  02/14/18 15:20  02/14/18 15:20  02/14/18 15:20  02/14/18 15:20








Intake and Output: 


 











 02/14/18 02/14/18





 06:59 18:59


 


Intake Total  225


 


Balance  225














- Medications


Medications: 


 Current Medications





Acetaminophen (Tylenol 325mg Tab)  650 mg PO Q6 PRN


   PRN Reason: Pain, Mild (1-3)


   Last Admin: 02/14/18 00:33 Dose:  650 mg


Docusate Sodium (Colace)  100 mg PO BID [facility]


   Last Admin: 02/14/18 17:49 Dose:  100 mg


Enoxaparin Sodium (Lovenox)  40 mg SC DAILY [facility]


   Last Admin: 02/13/18 10:15 Dose:  40 mg


Clindamycin Phosphate 300 mg/ (Sodium Chloride)  52 mls @ 100 mls/hr IVPB Q6H 

[facility]


   Last Admin: 02/14/18 17:49 Dose:  100 mls/hr


Piperacillin Sod/Tazobactam (Sod 3.375 gm/ Sodium Chloride)  100 mls @ 200 mls/

hr IVPB Q6H [facility]


   Last Admin: 02/14/18 16:51 Dose:  200 mls/hr


Insulin Human Regular (Novolin R)  0 unit SC ACHS [facility]


   PRN Reason: Protocol


   Last Admin: 02/14/18 16:50 Dose:  3 unit


Oxycodone/Acetaminophen (Percocet 5/325 Mg Tab)  1 tab PO Q4H PRN


   PRN Reason: Pain, moderate (4-7)


   Stop: 02/16/18 00:31


   Last Admin: 02/14/18 17:50 Dose:  1 tab











- Labs


Labs: 


 





 02/13/18 09:25 





 02/13/18 09:25 





 











PT  13.3 SECONDS (9.7-12.2)  H  02/14/18  07:11    


 


INR  1.2   02/14/18  07:11    


 


APTT  32 SECONDS (21-34)   02/14/18  07:11    














- Constitutional


Appears: No Acute Distress





- Respiratory Exam


Respiratory Exam: Clear to Ausculation Bilateral





- Cardiovascular Exam


Cardiovascular Exam: REGULAR RHYTHM.  absent: Murmur





- Rectal Exam


Rectal Exam: NORMAL INSPECTION (except a dressing on right buttock marion)





Assessment and Plan





- Assessment and Plan (Free Text)


Assessment: 





alpesh-rectal abscess


s/p I & D today


diabetes


Plan: 





continue iv antibiotics, zosyn and clindamycin


follow up with surgery


pain control

## 2018-02-14 NOTE — PCM.SURG1
Surgeon's Initial Post Op Note





- Surgeon's Notes


Surgeon: Carolyn


Assistant:  PGY4


Type of Anesthesia: IV Sedation, Local


Pre-Operative Diagnosis: perianal abscess


Operative Findings: see op note


Post-Operative Diagnosis: perirectal abscess


Operation Performed: Incision and Drainage of perirectal abscess


Specimen/Specimens Removed: pus culture


Estimated Blood Loss: EBL {In ML}: 10


Blood Products Given: N/A


Drains Used: No Drains


Post-Op Condition: Good


Date of Surgery/Procedure: 02/14/18


Time of Surgery/Procedure: 12:30

## 2018-02-15 VITALS
HEART RATE: 81 BPM | TEMPERATURE: 97.3 F | OXYGEN SATURATION: 95 % | DIASTOLIC BLOOD PRESSURE: 84 MMHG | SYSTOLIC BLOOD PRESSURE: 132 MMHG | RESPIRATION RATE: 18 BRPM

## 2018-02-15 LAB
BASOPHILS # BLD AUTO: 0 K/UL (ref 0–0.2)
BASOPHILS NFR BLD: 0.7 % (ref 0–2)
BUN SERPL-MCNC: 11 MG/DL (ref 9–20)
CALCIUM SERPL-MCNC: 8.5 MG/DL (ref 8.6–10.4)
EOSINOPHIL # BLD AUTO: 0.3 K/UL (ref 0–0.7)
EOSINOPHIL NFR BLD: 5.6 % (ref 0–4)
ERYTHROCYTE [DISTWIDTH] IN BLOOD BY AUTOMATED COUNT: 12.4 % (ref 11.5–14.5)
GFR NON-AFRICAN AMERICAN: > 60
HGB BLD-MCNC: 13.8 G/DL (ref 12–18)
LYMPHOCYTES # BLD AUTO: 1.6 K/UL (ref 1–4.3)
LYMPHOCYTES NFR BLD AUTO: 26 % (ref 20–40)
MCH RBC QN AUTO: 30.3 PG (ref 27–31)
MCHC RBC AUTO-ENTMCNC: 34.1 G/DL (ref 33–37)
MCV RBC AUTO: 88.7 FL (ref 80–94)
MONOCYTES # BLD: 0.6 K/UL (ref 0–0.8)
MONOCYTES NFR BLD: 9.8 % (ref 0–10)
NEUTROPHILS # BLD: 3.5 K/UL (ref 1.8–7)
NEUTROPHILS NFR BLD AUTO: 57.9 % (ref 50–75)
NRBC BLD AUTO-RTO: 0 % (ref 0–2)
PLATELET # BLD: 315 K/UL (ref 130–400)
PMV BLD AUTO: 7.7 FL (ref 7.2–11.7)
RBC # BLD AUTO: 4.54 MIL/UL (ref 4.4–5.9)
WBC # BLD AUTO: 6.1 K/UL (ref 4.8–10.8)

## 2018-02-15 RX ADMIN — SODIUM CHLORIDE SCH: 9 INJECTION, SOLUTION INTRAVENOUS at 12:07

## 2018-02-15 RX ADMIN — HUMAN INSULIN SCH UNIT: 100 INJECTION, SOLUTION SUBCUTANEOUS at 09:25

## 2018-02-15 RX ADMIN — SODIUM CHLORIDE SCH MLS/HR: 9 INJECTION, SOLUTION INTRAVENOUS at 05:16

## 2018-02-15 RX ADMIN — SODIUM CHLORIDE SCH MLS/HR: 9 INJECTION, SOLUTION INTRAMUSCULAR; INTRAVENOUS; SUBCUTANEOUS at 00:20

## 2018-02-15 RX ADMIN — HUMAN INSULIN SCH UNIT: 100 INJECTION, SOLUTION SUBCUTANEOUS at 12:17

## 2018-02-15 RX ADMIN — OXYCODONE HYDROCHLORIDE AND ACETAMINOPHEN PRN TAB: 5; 325 TABLET ORAL at 05:18

## 2018-02-15 RX ADMIN — SODIUM CHLORIDE SCH MLS/HR: 9 INJECTION, SOLUTION INTRAMUSCULAR; INTRAVENOUS; SUBCUTANEOUS at 06:38

## 2018-02-15 RX ADMIN — SODIUM CHLORIDE SCH: 9 INJECTION, SOLUTION INTRAMUSCULAR; INTRAVENOUS; SUBCUTANEOUS at 12:07

## 2018-02-15 NOTE — CP.PCM.PN
Subjective





- Date & Time of Evaluation


Date of Evaluation: 02/15/18


Time of Evaluation: 15:24





- Subjective


Subjective: 





no acute event


post I and D of alpesh-rectal abscess





Objective





- Vital Signs/Intake and Output


Vital Signs (last 24 hours): 


 











Temp Pulse Resp BP Pulse Ox


 


 97.3 F L  81   18   132/84   95 


 


 02/15/18 07:00  02/15/18 07:00  02/15/18 07:00  02/15/18 07:00  02/15/18 07:00








Intake and Output: 


 











 02/15/18 02/15/18





 06:59 18:59


 


Intake Total 800 


 


Balance 800 














- Labs


Labs: 


 





 02/15/18 07:38 





 02/15/18 07:38 





 











PT  13.3 SECONDS (9.7-12.2)  H  02/14/18  07:11    


 


INR  1.2   02/14/18  07:11    


 


APTT  32 SECONDS (21-34)   02/14/18  07:11    














- Constitutional


Appears: No Acute Distress





- Respiratory Exam


Respiratory Exam: Clear to Ausculation Bilateral





- Cardiovascular Exam


Cardiovascular Exam: REGULAR RHYTHM.  absent: Murmur





- GI/Abdominal Exam


GI & Abdominal Exam: Soft.  absent: Tenderness





Assessment and Plan





- Assessment and Plan (Free Text)


Assessment: 





s/p right alpesh-rectal abscess


diabetes uncontrolled A1c was 9.8%


Plan: 





discharge patient home today


continue po antibiotics


follow up as an out patient at my office in a week.

## 2018-02-15 NOTE — CP.PCM.PN
<Jose Velasquez - Last Filed: 02/15/18 15:30>





Subjective





- Date & Time of Evaluation


Date of Evaluation: 02/15/18


Time of Evaluation: 06:45





- Subjective


Subjective: 





General Surgery- Dr. Leon





Patient seen and examined at bedside this AM. No acute events overnight. pain 

well controlled at this time. Tolerating current diet. packing removed at 

bedside. new dressing C/D/I no active purulent drainage. Denies F/C CP/ SOB N/V/

D








Objective





- Vital Signs/Intake and Output


Vital Signs (last 24 hours): 


 











Temp Pulse Resp BP Pulse Ox


 


 98.8 F   73   20   115/70   96 


 


 02/15/18 00:45  02/15/18 00:45  02/15/18 00:45  02/15/18 00:45  02/15/18 00:45








Intake and Output: 


 











 02/15/18 02/15/18





 06:59 18:59


 


Intake Total 800 


 


Balance 800 














- Medications


Medications: 


 Current Medications





Acetaminophen (Tylenol 325mg Tab)  650 mg PO Q6 PRN


   PRN Reason: Pain, Mild (1-3)


   Last Admin: 02/14/18 00:33 Dose:  650 mg


Docusate Sodium (Colace)  100 mg PO BID [facility]


   Last Admin: 02/14/18 17:49 Dose:  100 mg


Enoxaparin Sodium (Lovenox)  40 mg SC DAILY [facility]


   Last Admin: 02/13/18 10:15 Dose:  40 mg


Clindamycin Phosphate 300 mg/ (Sodium Chloride)  52 mls @ 100 mls/hr IVPB Q6H 

[facility]


   Last Admin: 02/15/18 06:38 Dose:  100 mls/hr


Piperacillin Sod/Tazobactam (Sod 3.375 gm/ Sodium Chloride)  100 mls @ 200 mls/

hr IVPB Q6H [facility]


   Last Admin: 02/15/18 05:16 Dose:  200 mls/hr


Insulin Human Regular (Novolin R)  0 unit SC ACHS [facility]


   PRN Reason: Protocol


   Last Admin: 02/14/18 21:40 Dose:  Not Given


Oxycodone/Acetaminophen (Percocet 5/325 Mg Tab)  1 tab PO Q4H PRN


   PRN Reason: Pain, moderate (4-7)


   Stop: 02/16/18 00:31


   Last Admin: 02/15/18 05:18 Dose:  1 tab











- Labs


Labs: 


 





 02/15/18 07:38 





 02/15/18 07:38 





 











PT  13.3 SECONDS (9.7-12.2)  H  02/14/18  07:11    


 


INR  1.2   02/14/18  07:11    


 


APTT  32 SECONDS (21-34)   02/14/18  07:11    














- Constitutional


Appears: Non-toxic, No Acute Distress





- Head Exam


Head Exam: ATRAUMATIC





- Eye Exam


Eye Exam: EOMI.  absent: Scleral icterus





- ENT Exam


ENT Exam: Mucous Membranes Moist





- Cardiovascular Exam


Cardiovascular Exam: +S1, +S2.  absent: Bradycardia, Tachycardia





- GI/Abdominal Exam


GI & Abdominal Exam: Soft.  absent: Distended, Firm, Guarding, Rigid, Tenderness





- Rectal Exam


Additional comments: 





Incision in R. perianal


Dressing C/D/I





- Extremities Exam


Extremities Exam: Normal Inspection.  absent: Calf Tenderness





- Neurological Exam


Neurological Exam: Alert, Awake, Oriented x3





- Skin


Skin Exam: Intact, Warm





Assessment and Plan





- Assessment and Plan (Free Text)


Assessment: 





52M s/p Incision and Drainage of perirectal abscess POD#1


Plan: 





- packing removed


- no new packing; dry dressing on top


- keep area clean, can shower


- follow up w/ Dr. Leon in office in 1 week


- cleared for discharge from a surgical standpoint


- discussed w/ Dr. Carolyn Velasquez PGY1





<Yuri Leon - Last Filed: 02/15/18 21:05>





Objective





- Vital Signs/Intake and Output


Vital Signs (last 24 hours): 


 











Temp Pulse Resp BP Pulse Ox


 


 97.3 F L  81   18   132/84   95 


 


 02/15/18 07:00  02/15/18 07:00  02/15/18 07:00  02/15/18 07:00  02/15/18 07:00








Intake and Output: 


 











 02/15/18 02/16/18





 18:59 06:59


 


Intake Total 720 


 


Balance 720 














- Labs


Labs: 


 





 02/15/18 07:38 





 02/15/18 07:38 





 











PT  13.3 SECONDS (9.7-12.2)  H  02/14/18  07:11    


 


INR  1.2   02/14/18  07:11    


 


APTT  32 SECONDS (21-34)   02/14/18  07:11    














Attending/Attestation





- Attestation


I have personally seen and examined this patient.: Yes


I have fully participated in the care of the patient.: Yes


I have reviewed all pertinent clinical information, including history, physical 

exam and plan: Yes


Notes (Text): 





Pt was seen and examined at bedside 


Agree with above note and assessment

## 2018-02-16 NOTE — OP
PROCEDURE DATE:  02/14/2018



PREOPERATIVE DIAGNOSES:  Perianal abscess and cellulitis.



POSTOPERATIVE DIAGNOSES:  Perirectal abscess and cellulitis.



PROCEDURE DONE:

1.  Incision and drainage of perirectal abscess.

2.  Debridement of the wound of perirectal abscess.



PROCEDURE DONE BY:  Yuri Leon MD



ASSISTANT:   Stu Plummer, PGY-3 resident.



TYPE OF ANESTHESIA:  Local anesthesia plus sedation.



ESTIMATED BLOOD LOSS:  Around 10 mL.



DRAIN:   None.



PATHOLOGY:  The pus was sent for culture and sensitivity.



COMPLICATIONS:  None.



INTRAOPERATIVE FINDINGS:  The patient had left-sided perirectal abscess

with severe cellulitis of the gluteal area and approximately 30 to 40 mL of

pus was drained.



DESCRIPTION OF PROCEDURE:  On intraoperative steps, this 52-year-old male

who was diagnosed with perianal/perirectal abscess with cellulitis and

after IV antibiotics, the patient was consented for the incision and

drainage of the abscess, brought to the OR, placed supine on the operating

table.  After induction of the anesthesia, the patient was placed in left

lateral position,the perineal area was prepped and draped in the usual

sterile fashion, and cruciate-shaped  incision was made and after incising

skin and subcutaneous tissue, the abscess cavity was entered. The large

amount of pus was drained and the patient had two extension of the

perirectal abscess and the septa was broken down, and the blunt and short

debridement was done of the abscess cavity, and the wound was irrigated and

after proper debridement and after removal of all the abscess content as

well as the wall material, hemostasis was achieved.  The wound was

irrigated, and wound was packed with iodoform packing and dry sterile

dressing was applied.  The patient tolerated the procedure well.  Count of

the instruments and gauze was correct.  There were no apparent

complication.  The patient was extubated in the OR and sent to the

postanesthesia care unit in stable condition.





__________________________________________

Yuri Leon MD



DD:  02/15/2018 19:49:47

DT:  02/15/2018 21:52:06

Job # [id number]

## 2018-03-28 ENCOUNTER — HOSPITAL ENCOUNTER (EMERGENCY)
Dept: HOSPITAL 31 - C.ER | Age: 53
Discharge: HOME | End: 2018-03-28
Payer: COMMERCIAL

## 2018-03-28 VITALS
RESPIRATION RATE: 18 BRPM | SYSTOLIC BLOOD PRESSURE: 121 MMHG | DIASTOLIC BLOOD PRESSURE: 72 MMHG | HEART RATE: 70 BPM | TEMPERATURE: 97.8 F

## 2018-03-28 VITALS — BODY MASS INDEX: 38.2 KG/M2

## 2018-03-28 DIAGNOSIS — K61.1: ICD-10-CM

## 2018-03-28 DIAGNOSIS — Z51.89: Primary | ICD-10-CM

## 2018-03-28 DIAGNOSIS — I10: ICD-10-CM

## 2018-03-28 DIAGNOSIS — E78.00: ICD-10-CM

## 2018-03-28 NOTE — C.PDOC
History Of Present Illness





CO WOUND PAIN X SEV DAYS. S/P I&D 2/2018 DR RAYMOND FOR PERIRECTAL ABSCESS. 

PS STILL W DRAINAGE "BUT WHATS BOTHERING ME IS THE PAIN IN THE AREA WHEN I HAVE 

A BM". COMPLIANT W ABX, STOOL SOFTENERS. USING UNK CREAM "TO NUMB AREA" THAT 

WORKS TEMPORARILY. NO OTHER ASSOC SX


Time Seen by Provider: 03/28/18 13:46


Chief Complaint (Nursing): Wound Check


History Per: Patient


History/Exam Limitations: no limitations


Onset/Duration Of Symptoms: Days Ago


Severity: Moderate





Past Medical History


Reviewed: Historical Data, Nursing Documentation, Vital Signs


Vital Signs: 


 Last Vital Signs











Temp  97.8 F   03/28/18 14:24


 


Pulse  70   03/28/18 14:24


 


Resp  18   03/28/18 14:24


 


BP  121/72   03/28/18 14:24


 


Pulse Ox  97   03/28/18 16:12














- Medical History


PMH: Diverticulitis, HTN, Hypercholesterolemia


Surgical History: Tonsillectomy





- [facility] Procedures








DRAINAGE OF RECTUM, OPEN APPROACH, DIAGNOSTIC (02/12/18)


EXCISION OF RECTUM, VIA NATURAL OR ARTIFICIAL OPENING (02/12/18)








Family History: States: No Known Family Hx





- Social History


Hx Alcohol Use: No


Hx Substance Use: No





- Immunization History


Hx Tetanus Toxoid Vaccination: No


Hx Influenza Vaccination: No


Hx Pneumococcal Vaccination: No





Review Of Systems


Except As Marked, All Systems Reviewed And Found Negative.


Constitutional: Negative for: Fever, Chills


Gastrointestinal: Positive for: Rectal Pain





Physical Exam





- Physical Exam


Appears: Non-toxic, No Acute Distress


Skin: Normal Color, Warm


Head: Atraumatic, Normacephalic


Eye(s): bilateral: Normal Inspection


Neurological/Psych: Oriented x3, Normal Speech





ED Course And Treatment


O2 Sat by Pulse Oximetry: 97 (RA)


Pulse Ox Interpretation: Normal





Progress





- Re-Evaluation


Re-evaluation Note: 





03/28/18 13:45


D/W DR PEREZ SURG RES WILL EVAL IN ER


03/28/18 13:47


S/P EVAL, NO ACTIVE DRAINAGE WOUND HEALING WELL. PAIN MGMT, FU OFFICE





- Data Reviewed


Data Reviewed: Old records





Disposition


Counseled Patient/Family Regarding: Diagnosis, Need For Followup, Rx Given





- Disposition


Referrals: 


Yuri Raymond MD [Staff Provider] - 


Disposition: HOME/ ROUTINE


Disposition Time: 13:49


Condition: IMPROVED


Prescriptions: 


Lidocaine 2% Gel [Xylocaine 2% (Uro-Jet)] 0.5 appl TP BID #10 gel


Instructions:  Surgical Wound (DC)


Forms:  [facility] (English)





- Clinical Impression


Clinical Impression: 


 Wound pain, Wound check, abscess








- Scribe Statement


The provider has reviewed the documentation as recorded by the Raizaibe


Johanna Newby


Provider Attestation: 





All medical record entries made by the Raizaibe were at my direction and 

personally dictated by me. I have reviewed the chart and agree that the record 

accurately reflects my personal performance of the history, physical exam, 

medical decision making, and the department course for this patient. I have 

also personally directed, reviewed, and agree with the discharge instructions 

and disposition.

## 2018-03-29 VITALS — OXYGEN SATURATION: 97 %

## 2018-07-20 ENCOUNTER — HOSPITAL ENCOUNTER (OUTPATIENT)
Dept: HOSPITAL 31 - C.SDS | Age: 53
Discharge: HOME | End: 2018-07-20
Attending: COLON & RECTAL SURGERY
Payer: COMMERCIAL

## 2018-07-20 VITALS — SYSTOLIC BLOOD PRESSURE: 130 MMHG | HEART RATE: 77 BPM | OXYGEN SATURATION: 100 % | DIASTOLIC BLOOD PRESSURE: 68 MMHG

## 2018-07-20 VITALS — RESPIRATION RATE: 16 BRPM | TEMPERATURE: 97.6 F

## 2018-07-20 VITALS — BODY MASS INDEX: 38.2 KG/M2

## 2018-07-20 DIAGNOSIS — K60.3: Primary | ICD-10-CM

## 2018-07-20 PROCEDURE — 82948 REAGENT STRIP/BLOOD GLUCOSE: CPT

## 2018-07-20 PROCEDURE — 46020 PLACEMENT OF SETON: CPT

## 2018-07-20 PROCEDURE — 88305 TISSUE EXAM BY PATHOLOGIST: CPT

## 2018-07-20 RX ADMIN — HYDROMORPHONE HYDROCHLORIDE PRN MG: 1 INJECTION, SOLUTION INTRAMUSCULAR; INTRAVENOUS; SUBCUTANEOUS at 11:45

## 2018-07-20 RX ADMIN — HYDROMORPHONE HYDROCHLORIDE PRN MG: 1 INJECTION, SOLUTION INTRAMUSCULAR; INTRAVENOUS; SUBCUTANEOUS at 11:55

## 2018-07-20 NOTE — PCM.SURG1
<Casey Langston - Last Filed: 07/20/18 11:06>





Surgeon's Initial Post Op Note





- Surgeon's Notes


Surgeon: Carmel


Assistant: Ivis PGY4, Brad PGY3


Type of Anesthesia: General Endo, Local


Pre-Operative Diagnosis: Anal fistula


Operative Findings: Anal fistula 8 o'clock position


Post-Operative Diagnosis: same


Operation Performed: 1. Rigid sigmoidoscopy 2. Fistulotomy 3. Seton placement


Specimen/Specimens Removed: fistula tract


Estimated Blood Loss: EBL {In ML}: 15


Blood Products Given: N/A


Drains Used: No Drains


Post-Op Condition: Good


Date of Surgery/Procedure: 07/20/18


Time of Surgery/Procedure: 11:08





<Poly Burt - Last Filed: 07/20/18 11:32>





Surgeon's Initial Post Op Note





- Surgeon's Notes


Operative Findings: anal fistula right anterior

## 2018-07-20 NOTE — OP
PROCEDURE DATE:  07/20/2018



SURGEON:  Poly Burt MD



TYPE OF ANESTHESIA:  General.



ANESTHESIA ADMINISTERED BY:  Dr. Kraus.



PREOPERATIVE DIAGNOSIS:  Anal fistula.



POSTOPERATIVE DIAGNOSIS:  Anal fistula.



PROCEDURES:  Sigmoidoscopy, anal fistulotomy with seton insertion.



FINDINGS:  This is a 52-year-old who presented initially with an abscess

that was draining and persisted discharge.  There was an external opening

within the right anterior location about 2 inches from the anal verge.  An

MRI prior to the procedure showed fistula tract.  On sigmoidoscopy, there

were no _____ changes suggestive for inflammatory bowel disease. 

Otherwise, the fistula was noted extending from right anterior external

opening to the mid line and dentate line anteriorly.  No other opening was

noted on exam.



DESCRIPTION OF PROCEDURE:  After satisfactory general anesthesia with the

patient in the stirrups, the perineum was prepped and draped in the usual

fashion.  After the sigmoidoscopy was completed, the anal probe was passed

from the external opening into the dentate line with the Hill-Casey

retractor in place.  The fistula tract was opened down to this sphincter

complex, and then a seton of 0 silk was placed and tied.  The tract was

cleaned, debrided the external openings.  Hemostasis needed to be complete.

Marcaine 0.5% with epinephrine about 3 mL were infiltrated in the surgical

wound and a Surgicel was inserted.  Estimated blood loss less than 10 mL.





__________________________________________

Poly Burt MD





DD:  07/20/2018 10:58:06

DT:  07/20/2018 13:19:51

Job # [id number]

## 2018-07-20 NOTE — CP.SDSHP
[facility]





- History


Proposed Procedure: anal fistulotomy





- Previous Medical/Surgical History


Endocrine/Metabolic: Diabetes


Previous Surgical History: [id number] ID rectal abscess





- Allergies


Allergies: 


Allergies





Sulfa (Sulfonamide Antibiotics) Allergy (Verified 03/28/18 13:03)


 











- Physical Exam


Vital Signs: 


 Vital Signs











  07/20/18





  08:51


 


Temperature 97.3 F L


 


Pulse Rate 86


 


Respiratory 20





Rate 


 


Blood Pressure 127/83


 


O2 Sat by Pulse 97





Oximetry 














- Date & Time


Date: 07/20/18


Time: 10:00





Short Stay Discharge





- Short Stay Discharge


Admitting Diagnosis/Reason for Visit: ANAL FISTULA


Disposition: HOME/ ROUTINE

## 2023-05-16 NOTE — RAD
HISTORY:

pre-op  



COMPARISON:

None available. 



TECHNIQUE:

Chest, one view.



FINDINGS:

Examination limited by habitus and hypoinflation. 



LUNGS:

No focal consolidation.



Please note that chest x-ray has limited sensitivity for the 

detection of pulmonary masses.



PLEURA:

No significant pleural effusion identified. No definite pneumothorax .



CARDIOVASCULAR:

Heart size appears top normal.



OSSEOUS STRUCTURES:

 No acute osseous abnormality identified.



VISUALIZED UPPER ABDOMEN:

Unremarkable.



OTHER FINDINGS:

None.



IMPRESSION:

No focal consolidation identified. 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 pt is on Eliquis